# Patient Record
Sex: MALE | Race: WHITE | NOT HISPANIC OR LATINO | URBAN - METROPOLITAN AREA
[De-identification: names, ages, dates, MRNs, and addresses within clinical notes are randomized per-mention and may not be internally consistent; named-entity substitution may affect disease eponyms.]

---

## 2018-11-03 ENCOUNTER — EMERGENCY (EMERGENCY)
Facility: HOSPITAL | Age: 53
LOS: 1 days | Discharge: HOME | End: 2018-11-03
Admitting: EMERGENCY MEDICINE
Payer: SELF-PAY

## 2018-11-03 VITALS
TEMPERATURE: 99 F | DIASTOLIC BLOOD PRESSURE: 70 MMHG | SYSTOLIC BLOOD PRESSURE: 121 MMHG | RESPIRATION RATE: 17 BRPM | OXYGEN SATURATION: 97 % | HEART RATE: 82 BPM

## 2018-11-03 VITALS
DIASTOLIC BLOOD PRESSURE: 90 MMHG | HEART RATE: 114 BPM | SYSTOLIC BLOOD PRESSURE: 131 MMHG | OXYGEN SATURATION: 100 % | RESPIRATION RATE: 18 BRPM | TEMPERATURE: 99 F

## 2018-11-03 PROCEDURE — 99283 EMERGENCY DEPT VISIT LOW MDM: CPT

## 2018-11-03 NOTE — ED PROVIDER NOTE - MEDICAL DECISION MAKING DETAILS
Labs reviewed with no acute, concerning findings. Pt A&Ox3, NAD and sitting comfortably. Afebrile, nontoxic. Will D/C with supportive care instructions and to F/U with his PMD this week. Strict return precautions reviewed with pt in which pt verbalizes understanding and agrees to.

## 2018-11-03 NOTE — ED PROVIDER NOTE - OBJECTIVE STATEMENT
52 y/o M presents to the ED with 1 week of episodic fevers. He states the fevers come and go without any known aggravating or alleviating factors. His Tmax was 102.7 which was this morning before coming here. He states he did not take any medication for the fever this morning. He also reports having on and off generalized, pounding headaches for which he took ibuprofen earlier this week but is unsure if it helped. He does not have a headache currently. He does report having a localized allergic reaction described as itching and swelling (with unknown cause) to the right side of his lower lip 1 week ago which resolved with benadryl, but he is unsure if that was related to this week's sx's. He otherwise denies having any other associated sx's or acute medical complaints at this time.    Denies dizziness, confusion, syncope, vision changes, earache, sore throat, neck pain or stiffness, cough, CP, SOB, palpitations, abdo pain, N/V/D

## 2018-11-03 NOTE — ED ADULT NURSE NOTE - CHIEF COMPLAINT QUOTE
pt c/o fever this week tmax last night 102.7 has not taken ibuprofen for several days- last week felt tingling and swelling around right eye and right cheek. Afebrile in triage, hr 114, well appearing

## 2018-11-03 NOTE — ED ADULT TRIAGE NOTE - CHIEF COMPLAINT QUOTE
pt c/o fever this week tmax last night 102.7 has not taken ibuprofen for several day- last week felt tingling and swelling around right eye and right cheek. Afebrile in triage, hr 114, well appearing pt c/o fever this week tmax last night 102.7 has not taken ibuprofen for several days- last week felt tingling and swelling around right eye and right cheek. Afebrile in triage, hr 114, well appearing

## 2018-11-07 DIAGNOSIS — B34.9 VIRAL INFECTION, UNSPECIFIED: ICD-10-CM

## 2018-11-07 DIAGNOSIS — R50.9 FEVER, UNSPECIFIED: ICD-10-CM

## 2023-11-20 NOTE — ED PROVIDER NOTE - DISCHARGE REVIEW MATERIAL PRESENTED
----- Message from Harjinder Freed sent at 11/16/2023  8:58 AM CST -----  Contact: self  Type: Patient Returning Call        Who Called: Patient    Who Left Message for Patient: Nurse Amanda   Does the patient know what this is regarding?: yes  Best Call Back Number: [phone]  Additional Information: Plz call pt back with results. Pt wants a call anytime between 11:30- 1:30pm wont be available at 2pm. Will be available at 3pm or after. Thanks        .